# Patient Record
Sex: MALE | Race: WHITE | NOT HISPANIC OR LATINO | Employment: FULL TIME | URBAN - METROPOLITAN AREA
[De-identification: names, ages, dates, MRNs, and addresses within clinical notes are randomized per-mention and may not be internally consistent; named-entity substitution may affect disease eponyms.]

---

## 2023-11-13 ENCOUNTER — LAB (OUTPATIENT)
Dept: LAB | Facility: HOSPITAL | Age: 58
End: 2023-11-13

## 2023-11-13 ENCOUNTER — TRANSCRIBE ORDERS (OUTPATIENT)
Dept: LAB | Facility: HOSPITAL | Age: 58
End: 2023-11-13
Payer: COMMERCIAL

## 2023-11-13 DIAGNOSIS — K40.20 BILATERAL INGUINAL HERNIA WITHOUT OBSTRUCTION OR GANGRENE, RECURRENCE NOT SPECIFIED: Primary | ICD-10-CM

## 2023-11-13 DIAGNOSIS — K40.20 BILATERAL INGUINAL HERNIA WITHOUT OBSTRUCTION OR GANGRENE, RECURRENCE NOT SPECIFIED: ICD-10-CM

## 2023-11-13 PROCEDURE — 80048 BASIC METABOLIC PNL TOTAL CA: CPT

## 2023-11-13 PROCEDURE — 36415 COLL VENOUS BLD VENIPUNCTURE: CPT

## 2023-11-14 LAB
ANION GAP SERPL CALCULATED.3IONS-SCNC: 10.2 MMOL/L (ref 5–15)
BUN SERPL-MCNC: 14 MG/DL (ref 6–20)
BUN/CREAT SERPL: 10.9 (ref 7–25)
CALCIUM SPEC-SCNC: 9.8 MG/DL (ref 8.6–10.5)
CHLORIDE SERPL-SCNC: 103 MMOL/L (ref 98–107)
CO2 SERPL-SCNC: 29.8 MMOL/L (ref 22–29)
CREAT SERPL-MCNC: 1.29 MG/DL (ref 0.76–1.27)
EGFRCR SERPLBLD CKD-EPI 2021: 63.9 ML/MIN/1.73
GLUCOSE SERPL-MCNC: 108 MG/DL (ref 65–99)
POTASSIUM SERPL-SCNC: 4.8 MMOL/L (ref 3.5–5.2)
SODIUM SERPL-SCNC: 143 MMOL/L (ref 136–145)

## 2024-05-22 NOTE — PROGRESS NOTES
"                                                                 [facility]     Office Visit       Date: 05/23/2024   Patient Name: Joo Gonzalez  MRN: [id number]  YOB: 1965    Referring Physician: Rosalia Santana APRN     Chief Complaint:   Chief Complaint   Patient presents with    Right Elbow - Pain     History of Present Illness:   Joo Gonzalez is a 58 y.o. male right-hand-dominant presented clinic with complaints of right posterior elbow swelling.  He reports symptoms have been intermittent for the past 2 years.  He is very active with swimming and golfing and noted that while golfing over the winter he developed swelling to the posterior aspect of his elbow.  This is not painful or bothersome to him.  This has improved with time.  His PCP gave him a oral antibiotic as well as a Medrol Dosepak.  These improved his symptoms.  He has not attempted any compression or prior aspirations.  No other complaints or concerns.    Needs xrays.     Subjective   Review of Systems:   Review of Systems   Pertinent review of systems per HPI    I reviewed the patient's chief complaint, history of present illness, review of systems, past medical history, surgical history, family history, social history, medications and allergy list in the EMR on 05/23/2024 and agree with the findings above.    Objective    Quality Measures:   ACP:   ACP discussion was declined by the patient.      Tobacco:   Joo Gonzalez  reports that he has never smoked. He has never been exposed to tobacco smoke. He has never used smokeless tobacco.     Vital Signs:   Vitals:    05/23/24 0821   BP: 136/74   Weight: 85.5 kg (188 lb 9.6 oz)   Height: 156.2 cm (61.5\")     BMI:      General: No acute distress. Alert and oriented.     Ortho Exam:  Examination of the right upper extremity demonstrates mild prominence and swelling overlying the posterior aspect of the olecranon tip as compared to the contralateral side.  There is " no significant fluid that is palpated with thickening of the underlying bursa is noted as compared to contralateral side.  He has full elbow range of motion with flexion, extension, pronation and supination.  The remainder of the elbow is nontender to palpation.  Sensation intact light touch throughout the hand.  Warm and well-perfused distally.    Imaging / Studies:    Imaging Results (Last 24 Hours)       Procedure Component Value Units Date/Time    XR Elbow 3+ View Right [[id number]] Resulted: 05/23/24 0835     Updated: 05/23/24 0837    Narrative:      Right Elbow X-Ray    Indication: Pain    Views: AP, oblique and Lateral     Comparison:  None    Findings:  No fracture or dislocation.  Increased soft tissue window noted along the   posterior aspect of the olecranon.  Spurring noted at the olecranon tip.    There is a cortical irregularity noted along the radial aspect of the   proximal ulna that is well corticated.    Impression: Increased soft tissue window along the posterior aspect   olecranon with underlying olecranon spur.                     Assessment / Plan    Assessment/Plan:   Joo Gonzalez is a 58 y.o. male with right elbow olecranon bursitis, aseptic.    I discussed with the patient their clinical and radiographic findings demonstrate aseptic right elbow olecranon bursitis.  We had a lengthy discussion regarding the pathophysiology of their diagnosis.  I reviewed with the patient his radiographs and the relevant anatomy.  I discussed that he has olecranon bursitis that does appear to clinically be improving.  I discussed the aseptic versus septic nature of this diagnosis.  Nonoperative treatments include ice, compression wrapping, and anti-inflammatories.  If there is increased swelling with associated erythema and pain, I recommended a course of antibiotics and return to the clinic.  I also briefly discussed that the surgical option if having recurrent bouts of septic olecranon bursitis would  include olecranon bursectomy and underlying osteophyte excision.  The patient expressed understanding of his options.  His symptoms are improving.  Therefore he would like to continue nonoperative treatment with compression wrapping and anti-inflammatories.  He will return to clinic as needed. They were agreeable with the plan.  All questions and concerns were addressed.      ICD-10-CM ICD-9-CM   1. Olecranon bursitis of right elbow  M70.21 726.33   2. Right elbow pain  M25.521 719.42     Follow Up:   Return if symptoms worsen or fail to improve.      Francy Rebollar MD  [facility] [place] [facility]

## 2024-05-23 ENCOUNTER — OFFICE VISIT (OUTPATIENT)
Dept: ORTHOPEDIC SURGERY | Facility: CLINIC | Age: 59
End: 2024-05-23
Payer: COMMERCIAL

## 2024-05-23 VITALS
HEIGHT: 62 IN | WEIGHT: 188.6 LBS | BODY MASS INDEX: 34.71 KG/M2 | SYSTOLIC BLOOD PRESSURE: 136 MMHG | DIASTOLIC BLOOD PRESSURE: 74 MMHG

## 2024-05-23 DIAGNOSIS — M25.521 RIGHT ELBOW PAIN: ICD-10-CM

## 2024-05-23 DIAGNOSIS — M70.21 OLECRANON BURSITIS OF RIGHT ELBOW: Primary | ICD-10-CM

## 2025-08-20 ENCOUNTER — TRANSCRIBE ORDERS (OUTPATIENT)
Dept: ADMINISTRATIVE | Facility: HOSPITAL | Age: 60
End: 2025-08-20
Payer: COMMERCIAL

## 2025-08-20 DIAGNOSIS — R59.0 VIRCHOW'S NODE: Primary | ICD-10-CM

## 2025-08-20 DIAGNOSIS — R59.0 ENLARGED LYMPH NODE IN NECK: ICD-10-CM
